# Patient Record
Sex: FEMALE | Race: WHITE | NOT HISPANIC OR LATINO
[De-identification: names, ages, dates, MRNs, and addresses within clinical notes are randomized per-mention and may not be internally consistent; named-entity substitution may affect disease eponyms.]

---

## 2017-01-19 RX ORDER — GABAPENTIN 300 MG/1
300 CAPSULE ORAL
Qty: 270 | Refills: 3 | Status: ACTIVE | COMMUNITY
Start: 2017-01-19 | End: 1900-01-01

## 2017-02-02 ENCOUNTER — OTHER (OUTPATIENT)
Age: 77
End: 2017-02-02

## 2017-02-03 ENCOUNTER — OTHER (OUTPATIENT)
Age: 77
End: 2017-02-03

## 2017-02-03 RX ORDER — GABAPENTIN 100 MG/1
100 CAPSULE ORAL
Qty: 90 | Refills: 0 | Status: DISCONTINUED | COMMUNITY
Start: 2017-02-02 | End: 2017-02-03

## 2017-02-16 RX ORDER — GABAPENTIN 300 MG/1
300 CAPSULE ORAL 3 TIMES DAILY
Qty: 90 | Refills: 11 | Status: ACTIVE | COMMUNITY
Start: 2017-02-16 | End: 1900-01-01

## 2017-02-20 RX ORDER — AMITRIPTYLINE HYDROCHLORIDE 10 MG/1
10 TABLET, FILM COATED ORAL
Qty: 30 | Refills: 3 | Status: ACTIVE | COMMUNITY
Start: 2017-02-20 | End: 1900-01-01

## 2017-05-17 ENCOUNTER — APPOINTMENT (OUTPATIENT)
Dept: OPHTHALMOLOGY | Facility: CLINIC | Age: 77
End: 2017-05-17

## 2017-09-07 ENCOUNTER — APPOINTMENT (OUTPATIENT)
Dept: OPHTHALMOLOGY | Facility: CLINIC | Age: 77
End: 2017-09-07
Payer: MEDICARE

## 2017-09-07 DIAGNOSIS — H00.15 CHALAZION LEFT LOWER EYELID: ICD-10-CM

## 2017-09-07 PROCEDURE — 92012 INTRM OPH EXAM EST PATIENT: CPT

## 2017-11-09 ENCOUNTER — APPOINTMENT (OUTPATIENT)
Dept: PULMONOLOGY | Facility: CLINIC | Age: 77
End: 2017-11-09
Payer: MEDICARE

## 2017-11-09 VITALS
HEIGHT: 66 IN | WEIGHT: 107 LBS | OXYGEN SATURATION: 98 % | DIASTOLIC BLOOD PRESSURE: 62 MMHG | TEMPERATURE: 98.7 F | HEART RATE: 67 BPM | SYSTOLIC BLOOD PRESSURE: 100 MMHG | BODY MASS INDEX: 17.19 KG/M2

## 2017-11-09 PROCEDURE — 99214 OFFICE O/P EST MOD 30 MIN: CPT | Mod: 25

## 2017-11-09 PROCEDURE — 94010 BREATHING CAPACITY TEST: CPT

## 2017-11-17 ENCOUNTER — LABORATORY RESULT (OUTPATIENT)
Age: 77
End: 2017-11-17

## 2017-12-26 RX ORDER — AZITHROMYCIN 500 MG/1
500 TABLET, FILM COATED ORAL
Qty: 12 | Refills: 11 | Status: ACTIVE | COMMUNITY
Start: 2017-12-26 | End: 1900-01-01

## 2017-12-29 LAB — ACID FAST STN SPT: ABNORMAL

## 2018-01-05 LAB — ACID FAST STN SPT: ABNORMAL

## 2018-01-07 RX ORDER — ALBUTEROL SULFATE 90 UG/1
108 (90 BASE) AEROSOL, METERED RESPIRATORY (INHALATION)
Qty: 1 | Refills: 11 | Status: ACTIVE | COMMUNITY
Start: 2018-01-07 | End: 1900-01-01

## 2018-01-23 ENCOUNTER — APPOINTMENT (OUTPATIENT)
Dept: PULMONOLOGY | Facility: CLINIC | Age: 78
End: 2018-01-23
Payer: MEDICARE

## 2018-01-23 VITALS
DIASTOLIC BLOOD PRESSURE: 70 MMHG | SYSTOLIC BLOOD PRESSURE: 120 MMHG | HEIGHT: 66 IN | BODY MASS INDEX: 17.19 KG/M2 | WEIGHT: 107 LBS | OXYGEN SATURATION: 96 % | TEMPERATURE: 98.3 F | HEART RATE: 68 BPM

## 2018-01-23 PROCEDURE — 94010 BREATHING CAPACITY TEST: CPT

## 2018-01-23 PROCEDURE — 99214 OFFICE O/P EST MOD 30 MIN: CPT | Mod: 25

## 2018-02-20 LAB — ACID FAST STN SPT: ABNORMAL

## 2018-03-29 ENCOUNTER — APPOINTMENT (OUTPATIENT)
Dept: OPHTHALMOLOGY | Facility: CLINIC | Age: 78
End: 2018-03-29
Payer: MEDICARE

## 2018-03-29 PROCEDURE — 92014 COMPRE OPH EXAM EST PT 1/>: CPT

## 2018-04-10 ENCOUNTER — APPOINTMENT (OUTPATIENT)
Dept: OPHTHALMOLOGY | Facility: CLINIC | Age: 78
End: 2018-04-10
Payer: MEDICARE

## 2018-04-10 DIAGNOSIS — H26.493 OTHER SECONDARY CATARACT, BILATERAL: ICD-10-CM

## 2018-04-10 PROCEDURE — 66821 AFTER CATARACT LASER SURGERY: CPT | Mod: RT

## 2018-04-18 ENCOUNTER — APPOINTMENT (OUTPATIENT)
Dept: OPHTHALMOLOGY | Facility: CLINIC | Age: 78
End: 2018-04-18
Payer: MEDICARE

## 2018-04-18 DIAGNOSIS — H26.491 OTHER SECONDARY CATARACT, RIGHT EYE: ICD-10-CM

## 2018-04-18 PROCEDURE — 99024 POSTOP FOLLOW-UP VISIT: CPT

## 2018-10-31 ENCOUNTER — APPOINTMENT (OUTPATIENT)
Dept: OPHTHALMOLOGY | Facility: CLINIC | Age: 78
End: 2018-10-31
Payer: MEDICARE

## 2018-10-31 DIAGNOSIS — H26.8 OTHER SPECIFIED CATARACT: ICD-10-CM

## 2018-10-31 DIAGNOSIS — H26.492 OTHER SECONDARY CATARACT, LEFT EYE: ICD-10-CM

## 2018-10-31 DIAGNOSIS — Z96.1 PRESENCE OF INTRAOCULAR LENS: ICD-10-CM

## 2018-10-31 DIAGNOSIS — H43.813 VITREOUS DEGENERATION, BILATERAL: ICD-10-CM

## 2018-10-31 DIAGNOSIS — H02.89 OTHER SPECIFIED DISORDERS OF EYELID: ICD-10-CM

## 2018-10-31 PROCEDURE — 92012 INTRM OPH EXAM EST PATIENT: CPT

## 2019-05-22 ENCOUNTER — NON-APPOINTMENT (OUTPATIENT)
Age: 79
End: 2019-05-22

## 2019-05-22 ENCOUNTER — APPOINTMENT (OUTPATIENT)
Dept: OPHTHALMOLOGY | Facility: CLINIC | Age: 79
End: 2019-05-22
Payer: MEDICARE

## 2019-05-22 PROCEDURE — 92014 COMPRE OPH EXAM EST PT 1/>: CPT

## 2020-05-27 ENCOUNTER — APPOINTMENT (OUTPATIENT)
Dept: OPHTHALMOLOGY | Facility: CLINIC | Age: 80
End: 2020-05-27

## 2020-06-16 ENCOUNTER — NON-APPOINTMENT (OUTPATIENT)
Age: 80
End: 2020-06-16

## 2020-06-16 ENCOUNTER — APPOINTMENT (OUTPATIENT)
Dept: OPHTHALMOLOGY | Facility: CLINIC | Age: 80
End: 2020-06-16
Payer: MEDICARE

## 2020-06-16 PROCEDURE — 99442: CPT | Mod: 95

## 2020-10-27 ENCOUNTER — NON-APPOINTMENT (OUTPATIENT)
Age: 80
End: 2020-10-27

## 2020-10-27 ENCOUNTER — APPOINTMENT (OUTPATIENT)
Dept: OPHTHALMOLOGY | Facility: CLINIC | Age: 80
End: 2020-10-27
Payer: MEDICARE

## 2020-10-27 PROCEDURE — 92014 COMPRE OPH EXAM EST PT 1/>: CPT

## 2021-07-02 ENCOUNTER — INPATIENT (INPATIENT)
Facility: HOSPITAL | Age: 81
LOS: 0 days | Discharge: HOME | DRG: 313 | End: 2021-07-03
Attending: INTERNAL MEDICINE | Admitting: INTERNAL MEDICINE
Payer: COMMERCIAL

## 2021-07-02 VITALS
HEIGHT: 65 IN | RESPIRATION RATE: 18 BRPM | HEART RATE: 65 BPM | DIASTOLIC BLOOD PRESSURE: 79 MMHG | WEIGHT: 104.94 LBS | SYSTOLIC BLOOD PRESSURE: 160 MMHG | OXYGEN SATURATION: 99 % | TEMPERATURE: 98 F

## 2021-07-02 LAB
ALBUMIN SERPL ELPH-MCNC: 4.4 G/DL — SIGNIFICANT CHANGE UP (ref 3.3–5)
ALP SERPL-CCNC: 49 U/L — SIGNIFICANT CHANGE UP (ref 40–120)
ALT FLD-CCNC: 10 U/L — SIGNIFICANT CHANGE UP (ref 10–45)
ANION GAP SERPL CALC-SCNC: 11 MMOL/L — SIGNIFICANT CHANGE UP (ref 5–17)
AST SERPL-CCNC: 19 U/L — SIGNIFICANT CHANGE UP (ref 10–40)
BASOPHILS # BLD AUTO: 0.05 K/UL — SIGNIFICANT CHANGE UP (ref 0–0.2)
BASOPHILS NFR BLD AUTO: 0.8 % — SIGNIFICANT CHANGE UP (ref 0–2)
BILIRUB SERPL-MCNC: 0.7 MG/DL — SIGNIFICANT CHANGE UP (ref 0.2–1.2)
BUN SERPL-MCNC: 8 MG/DL — SIGNIFICANT CHANGE UP (ref 7–23)
CALCIUM SERPL-MCNC: 8.3 MG/DL — LOW (ref 8.4–10.5)
CHLORIDE SERPL-SCNC: 92 MMOL/L — LOW (ref 96–108)
CK MB CFR SERPL CALC: 4.4 NG/ML — SIGNIFICANT CHANGE UP (ref 0–6.7)
CK MB CFR SERPL CALC: 5.2 NG/ML — SIGNIFICANT CHANGE UP (ref 0–6.7)
CK SERPL-CCNC: 112 U/L — SIGNIFICANT CHANGE UP (ref 25–170)
CK SERPL-CCNC: 115 U/L — SIGNIFICANT CHANGE UP (ref 25–170)
CO2 SERPL-SCNC: 23 MMOL/L — SIGNIFICANT CHANGE UP (ref 22–31)
CREAT SERPL-MCNC: 0.72 MG/DL — SIGNIFICANT CHANGE UP (ref 0.5–1.3)
EOSINOPHIL # BLD AUTO: 0.08 K/UL — SIGNIFICANT CHANGE UP (ref 0–0.5)
EOSINOPHIL NFR BLD AUTO: 1.3 % — SIGNIFICANT CHANGE UP (ref 0–6)
GLUCOSE SERPL-MCNC: 103 MG/DL — HIGH (ref 70–99)
HCT VFR BLD CALC: 37.9 % — SIGNIFICANT CHANGE UP (ref 34.5–45)
HGB BLD-MCNC: 12.7 G/DL — SIGNIFICANT CHANGE UP (ref 11.5–15.5)
IMM GRANULOCYTES NFR BLD AUTO: 0.3 % — SIGNIFICANT CHANGE UP (ref 0–1.5)
LYMPHOCYTES # BLD AUTO: 1.92 K/UL — SIGNIFICANT CHANGE UP (ref 1–3.3)
LYMPHOCYTES # BLD AUTO: 31.5 % — SIGNIFICANT CHANGE UP (ref 13–44)
MCHC RBC-ENTMCNC: 30.6 PG — SIGNIFICANT CHANGE UP (ref 27–34)
MCHC RBC-ENTMCNC: 33.5 GM/DL — SIGNIFICANT CHANGE UP (ref 32–36)
MCV RBC AUTO: 91.3 FL — SIGNIFICANT CHANGE UP (ref 80–100)
MONOCYTES # BLD AUTO: 0.7 K/UL — SIGNIFICANT CHANGE UP (ref 0–0.9)
MONOCYTES NFR BLD AUTO: 11.5 % — SIGNIFICANT CHANGE UP (ref 2–14)
NEUTROPHILS # BLD AUTO: 3.33 K/UL — SIGNIFICANT CHANGE UP (ref 1.8–7.4)
NEUTROPHILS NFR BLD AUTO: 54.6 % — SIGNIFICANT CHANGE UP (ref 43–77)
NRBC # BLD: 0 /100 WBCS — SIGNIFICANT CHANGE UP (ref 0–0)
PLATELET # BLD AUTO: 263 K/UL — SIGNIFICANT CHANGE UP (ref 150–400)
POTASSIUM SERPL-MCNC: 3.9 MMOL/L — SIGNIFICANT CHANGE UP (ref 3.5–5.3)
POTASSIUM SERPL-SCNC: 3.9 MMOL/L — SIGNIFICANT CHANGE UP (ref 3.5–5.3)
PROT SERPL-MCNC: 7 G/DL — SIGNIFICANT CHANGE UP (ref 6–8.3)
RBC # BLD: 4.15 M/UL — SIGNIFICANT CHANGE UP (ref 3.8–5.2)
RBC # FLD: 13 % — SIGNIFICANT CHANGE UP (ref 10.3–14.5)
SARS-COV-2 RNA SPEC QL NAA+PROBE: NEGATIVE — SIGNIFICANT CHANGE UP
SODIUM SERPL-SCNC: 126 MMOL/L — LOW (ref 135–145)
TROPONIN T SERPL-MCNC: <0.01 NG/ML — SIGNIFICANT CHANGE UP (ref 0–0.01)
TROPONIN T SERPL-MCNC: <0.01 NG/ML — SIGNIFICANT CHANGE UP (ref 0–0.01)
WBC # BLD: 6.1 K/UL — SIGNIFICANT CHANGE UP (ref 3.8–10.5)
WBC # FLD AUTO: 6.1 K/UL — SIGNIFICANT CHANGE UP (ref 3.8–10.5)

## 2021-07-02 PROCEDURE — 93010 ELECTROCARDIOGRAM REPORT: CPT

## 2021-07-02 PROCEDURE — 71045 X-RAY EXAM CHEST 1 VIEW: CPT | Mod: 26

## 2021-07-02 PROCEDURE — 99285 EMERGENCY DEPT VISIT HI MDM: CPT | Mod: CS

## 2021-07-02 RX ORDER — POLYETHYLENE GLYCOL 3350 17 G/17G
17 POWDER, FOR SOLUTION ORAL ONCE
Refills: 0 | Status: COMPLETED | OUTPATIENT
Start: 2021-07-02 | End: 2021-07-02

## 2021-07-02 RX ORDER — ENOXAPARIN SODIUM 100 MG/ML
40 INJECTION SUBCUTANEOUS EVERY 24 HOURS
Refills: 0 | Status: DISCONTINUED | OUTPATIENT
Start: 2021-07-02 | End: 2021-07-03

## 2021-07-02 RX ORDER — SENNA PLUS 8.6 MG/1
2 TABLET ORAL AT BEDTIME
Refills: 0 | Status: DISCONTINUED | OUTPATIENT
Start: 2021-07-02 | End: 2021-07-03

## 2021-07-02 RX ADMIN — POLYETHYLENE GLYCOL 3350 17 GRAM(S): 17 POWDER, FOR SOLUTION ORAL at 22:49

## 2021-07-02 RX ADMIN — SENNA PLUS 2 TABLET(S): 8.6 TABLET ORAL at 22:49

## 2021-07-02 RX ADMIN — ENOXAPARIN SODIUM 40 MILLIGRAM(S): 100 INJECTION SUBCUTANEOUS at 22:49

## 2021-07-02 NOTE — ED ADULT TRIAGE NOTE - OTHER COMPLAINTS
Pt states she called her MD, who recommended her to come here. Pt denies any PMH. however, pt reports SOB on exertion for a while. Pt denies any chest pain, SOB or palpitation at the moment. States her BP was in the 170s at home

## 2021-07-02 NOTE — H&P ADULT - PROBLEM SELECTOR PLAN 4
F: none  E: Replete K<4, Mg<2 F: none  E: Replete K<4, Mg<2  N: Regular Diet  DVT ppx: Lovenox 40mg subq  Dispo: RMF

## 2021-07-02 NOTE — H&P ADULT - ASSESSMENT
80F no PMH presenting with chest tightness which started a few hours prior to admission, admitted for further evaluation.

## 2021-07-02 NOTE — H&P ADULT - NSHPPHYSICALEXAM_GEN_ALL_CORE
Vital Signs Last 12 Hrs  T(F): 98 (07-02-21 @ 22:34), Max: 98.2 (07-02-21 @ 18:43)  HR: 62 (07-02-21 @ 22:34) (62 - 65)  BP: 169/72 (07-02-21 @ 22:34) (140/72 - 169/72)  BP(mean): --  RR: 16 (07-02-21 @ 22:34) (16 - 18)  SpO2: 97% (07-02-21 @ 22:34) (97% - 99%)    PHYSICAL EXAM:  Constitutional: NAD, comfortable in bed.  HEENT: NC/AT, PERRLA, EOMI, no conjunctival pallor or scleral icterus, MMM  Neck: Supple, no JVD  Respiratory: CTA B/L. No w/r/r.   Cardiovascular: RRR, normal S1 and S2, no m/r/g.   Gastrointestinal: +BS, soft NTND, no guarding or rebound tenderness, no palpable masses   Extremities: wwp; no cyanosis, clubbing or edema.   Vascular: Pulses equal and strong throughout.   Neurological: AAOx3, no CN deficits, strength and sensation intact throughout.   Skin: No gross skin abnormalities or rashes Vital Signs Last 12 Hrs  T(F): 98 (07-02-21 @ 22:34), Max: 98.2 (07-02-21 @ 18:43)  HR: 62 (07-02-21 @ 22:34) (62 - 65)  BP: 169/72 (07-02-21 @ 22:34) (140/72 - 169/72)  BP(mean): --  RR: 16 (07-02-21 @ 22:34) (16 - 18)  SpO2: 97% (07-02-21 @ 22:34) (97% - 99%)    PHYSICAL EXAM:  Constitutional: NAD, comfortable in bed.  HEENT: NC/AT, PERRLA, EOMI, no conjunctival pallor or scleral icterus, dry mucus membranes  Neck: Supple, no JVD  Respiratory: CTA B/L. No w/r/r.   Cardiovascular: RRR, normal S1 and S2, no m/r/g.   Gastrointestinal: +BS, soft NTND, no guarding or rebound tenderness, no palpable masses   Extremities: wwp; no cyanosis, clubbing or edema.   Vascular: Pulses equal and strong throughout.   Neurological: AAOx3, no CN deficits, strength and sensation intact throughout.   Skin: No gross skin abnormalities or rashes

## 2021-07-02 NOTE — PATIENT PROFILE ADULT - OVER THE PAST TWO WEEKS HAVE YOU FELT DOWN, DEPRESSED OR HOPELESS?
pt states she's had trouble coping with the loss of her  who  in January; [facility]

## 2021-07-02 NOTE — H&P ADULT - PROBLEM SELECTOR PLAN 1
- Recent cardiac workup with Dr. Cavanaugh included an echo and a CTA coronary:  - Normal left main, no plaque or stenosis. Mid R coronary <25% plaque; mild increased risk for CAD.   - Echo: normal wall motion and left ventricular function; mild AR, mild MR, normal EF, normal LV compliance.  - EKG on admission with borderline RBBB and no acute ischemic changes  - cardiac enzymes negative x 2  - chest tightness self-resolved presents with a few hours of chest tightness that started suddenly. 2/10 in severity. She denies any exacerbating or relieving factors and denies radiation. Pain not reproducible. Hemodynamically stable on admission. Chest pain self-resolved. Less likely ACS related.  - Recent cardiac workup with Dr. Cavanaugh included an echo and a CTA coronary:  - Normal left main, no plaque or stenosis. Mid R coronary <25% plaque; mild increased risk for CAD.   - Echo: normal wall motion and left ventricular function; mild AR, mild MR, normal EF, normal LV compliance.  - EKG on admission with borderline RBBB and no acute ischemic changes  - CXR clear  - cardiac enzymes negative x 2  - chest tightness self-resolved  - f/u AM EKG  - continue to monitor

## 2021-07-02 NOTE — H&P ADULT - PROBLEM SELECTOR PLAN 2
endorses SOB while walking a few blocks for the past 2 months. At baseline, patient says she is able to walk 2-3 miles. Patient admits to having chronic cough which has been treated as neurogenic.  - On exam, patient with no JVD, crackles, or LE edema  - BNP 1986  - recent echo normal wall motion and left ventricular function; mild AR, mild MR, normal EF, normal LV compliance.  - CXR clear  - saturating well on room air

## 2021-07-02 NOTE — H&P ADULT - HISTORY OF PRESENT ILLNESS
80F no PMH presenting with chest tightness which started a few hours prior to admission She notes that the chest tightness began suddenly and was a 2/10 in severity and did no radiate anywhere. She also felt short of breath and took 3 puffs of her inhaler which increased her sBPs to 170s. She says that she is usually able to walk 2-3 miles but for the past 2 months has been having difficulty walking a few blocks given her dyspnea on exertion. She has a chronic cough for the past few years attributed to neurogenic causes. She recently had a cardiac evaluation with Dr. Cavanaugh a month ago - previous echo and CTA coronary was done. Normal left main, no plaque or stenosis. Mid R coronary <25% plaque; mild increased risk for CAD. Echo with normal wall motion and left ventricular function; mild AR, mild MR, normal EF, normal LV compliance. She denies any smoking history. She only takes aspirin at home and has an inhaler for a past diagnosis of asthma which she rarely uses.    ED Course:  Vitals: Tmax 98.2F, HR 62, /72, RR 16, spO2 98% room air  Labs: wbc 6.10, hb 12.7, plts 263, Na 126, K 3.9, Cl 92, BUN/Cr 8/0.72, glucose 103, calcium 8.3, cardiac enzymes negative x2  EKG: NSR with borderline RBBB  Imaging: CXR clear  Interventions: none   80F no PMH presenting with chest tightness which started a few hours prior to admission. She notes that the chest tightness began suddenly and was a 2/10 in severity and did no radiate anywhere. She also felt short of breath and took 3 puffs of her inhaler which increased her sBPs to 170s. She says that she is usually able to walk 2-3 miles but for the past 2 months has been having difficulty walking a few blocks given her dyspnea on exertion. She has a chronic cough for the past few years attributed to neurogenic causes. She recently had a cardiac evaluation with Dr. Cavanaugh a month ago - previous echo and CTA coronary was done. Normal left main, no plaque or stenosis. Mid R coronary <25% plaque; mild increased risk for CAD. Echo with normal wall motion and left ventricular function; mild AR, mild MR, normal EF, normal LV compliance. She denies any smoking history. She only takes aspirin at home and has an inhaler for a past diagnosis of asthma which she rarely uses.    ED Course:  Vitals: Tmax 98.2F, HR 62, /72, RR 16, spO2 98% room air  Labs: wbc 6.10, hb 12.7, plts 263, Na 126, K 3.9, Cl 92, BUN/Cr 8/0.72, glucose 103, calcium 8.3, cardiac enzymes negative x2  EKG: NSR with borderline RBBB  Imaging: CXR clear  Interventions: none   80F no PMH presenting with chest tightness which started a few hours prior to admission. She notes that the chest tightness began suddenly and was a 2/10 in severity and did not radiate anywhere. She also felt short of breath and took 3 puffs of her inhaler which increased her sBPs to 170s. She says that she is usually able to walk 2-3 miles but for the past 2 months has been having difficulty walking a few blocks given her dyspnea on exertion. She has a chronic cough for the past few years attributed to neurogenic causes. She recently had a cardiac evaluation with Dr. Cavanaugh a month ago - previous echo and CTA coronary was done. Normal left main, no plaque or stenosis. Mid R coronary <25% plaque; mild increased risk for CAD. Echo with normal wall motion and left ventricular function; mild AR, mild MR, normal EF, normal LV compliance. She denies any smoking history. She only takes aspirin at home and has an inhaler for a past diagnosis of asthma which she rarely uses.    ED Course:  Vitals: Tmax 98.2F, HR 62, /72, RR 16, spO2 98% room air  Labs: wbc 6.10, hb 12.7, plts 263, Na 126, K 3.9, Cl 92, BUN/Cr 8/0.72, glucose 103, calcium 8.3, cardiac enzymes negative x2  EKG: NSR with borderline RBBB  Imaging: CXR clear  Interventions: none   80F no PMH presenting with chest tightness which started a few hours prior to admission. She notes that the chest tightness began suddenly and was a 2/10 in severity and did not radiate anywhere. She also felt short of breath and took 3 puffs of her inhaler which increased her sBPs to 170s. She says that she is usually able to walk 2-3 miles but for the past 2 months has been having difficulty walking a few blocks given her dyspnea on exertion. She has a chronic cough for the past few years attributed to neurogenic causes. She recently had a cardiac evaluation with Dr. Cavanaugh a month ago - echo and CTA coronary were performed showing normal left main, no plaque or stenosis. Mid R coronary <25% plaque; mild increased risk for CAD. Echo with normal wall motion and left ventricular function; mild AR, mild MR, normal EF, normal LV compliance. She denies any smoking history. She only takes aspirin at home and has an inhaler for a past diagnosis of asthma which she rarely uses.    ED Course:  Vitals: Tmax 98.2F, HR 62, /72, RR 16, spO2 98% room air  Labs: wbc 6.10, hb 12.7, plts 263, Na 126, K 3.9, Cl 92, BUN/Cr 8/0.72, glucose 103, calcium 8.3, cardiac enzymes negative x2  EKG: NSR with borderline RBBB  Imaging: CXR clear  Interventions: none

## 2021-07-02 NOTE — H&P ADULT - NSHPLABSRESULTS_GEN_ALL_CORE
LABS:                         12.7   6.10  )-----------( 263      ( 02 Jul 2021 19:38 )             37.9     07-02    126<L>  |  92<L>  |  8   ----------------------------<  103<H>  3.9   |  23  |  0.72    Ca    8.3<L>      02 Jul 2021 19:39    TPro  7.0  /  Alb  4.4  /  TBili  0.7  /  DBili  x   /  AST  19  /  ALT  10  /  AlkPhos  49  07-02        CARDIAC MARKERS ( 02 Jul 2021 22:21 )  x     / <0.01 ng/mL / 112 U/L / x     / 4.4 ng/mL  CARDIAC MARKERS ( 02 Jul 2021 19:39 )  x     / <0.01 ng/mL / 115 U/L / x     / 5.2 ng/mL      Serum Pro-Brain Natriuretic Peptide: 1986 pg/mL (07-02 @ 19:39)        RADIOLOGY, EKG & ADDITIONAL TESTS:

## 2021-07-02 NOTE — ED PROVIDER NOTE - CLINICAL SUMMARY MEDICAL DECISION MAKING FREE TEXT BOX
Pt is a 80F with chest pain. Will check labs, CXR, EKG.   Case discussed with Dr. Cavanaugh- previous echo and CTA coronary was done. Pt with normal left main, no plaque or stenosis. Mid R coronary <25% plaque; mild increased risk for CAD.   Echo: normal wall motion and left ventricular function; mild AR, mild MR, normal EF, normal LV compliance.  Pt seen by cardiology fellow, recommended admission to medicine.   Case discussed with Dr. Blue- accepts pt to his service.

## 2021-07-02 NOTE — H&P ADULT - PROBLEM SELECTOR PLAN 3
presenting with Na 126 with unknown baseline. Patient mentating well. Likely due to hypovolemic hyponatremia given patient appears dry on exam  - continue to encourage PO intake  - f/u serum osmol  - f/u urine lytes  - continue to monitor BMP

## 2021-07-02 NOTE — ED ADULT NURSE NOTE - OBJECTIVE STATEMENT
Patient is a 81yo F, arrived to ED via walk-in, AAOx3, in NAD, VSS, complaining of chest tightness at 5PM today, self administered 4-5 puffs of her inhaler, called her PMD, who referred her to the ED.  Patient also complaining of GAFFNEY for one month.  Patient denies any SOB at rest, N/V/D, fevers or any other complaints at this time.  PIV placed, labs drawn and sent, EKG done.

## 2021-07-03 ENCOUNTER — TRANSCRIPTION ENCOUNTER (OUTPATIENT)
Age: 81
End: 2021-07-03

## 2021-07-03 VITALS
RESPIRATION RATE: 17 BRPM | TEMPERATURE: 98 F | OXYGEN SATURATION: 95 % | SYSTOLIC BLOOD PRESSURE: 120 MMHG | DIASTOLIC BLOOD PRESSURE: 70 MMHG | HEART RATE: 52 BPM

## 2021-07-03 DIAGNOSIS — R07.89 OTHER CHEST PAIN: ICD-10-CM

## 2021-07-03 DIAGNOSIS — Z90.710 ACQUIRED ABSENCE OF BOTH CERVIX AND UTERUS: Chronic | ICD-10-CM

## 2021-07-03 DIAGNOSIS — E87.1 HYPO-OSMOLALITY AND HYPONATREMIA: ICD-10-CM

## 2021-07-03 DIAGNOSIS — Z29.9 ENCOUNTER FOR PROPHYLACTIC MEASURES, UNSPECIFIED: ICD-10-CM

## 2021-07-03 DIAGNOSIS — R06.00 DYSPNEA, UNSPECIFIED: ICD-10-CM

## 2021-07-03 LAB
ALBUMIN SERPL ELPH-MCNC: 3.7 G/DL — SIGNIFICANT CHANGE UP (ref 3.3–5)
ALP SERPL-CCNC: 43 U/L — SIGNIFICANT CHANGE UP (ref 40–120)
ALT FLD-CCNC: 9 U/L — LOW (ref 10–45)
ANION GAP SERPL CALC-SCNC: 7 MMOL/L — SIGNIFICANT CHANGE UP (ref 5–17)
AST SERPL-CCNC: 15 U/L — SIGNIFICANT CHANGE UP (ref 10–40)
BASOPHILS # BLD AUTO: 0.05 K/UL — SIGNIFICANT CHANGE UP (ref 0–0.2)
BASOPHILS NFR BLD AUTO: 1 % — SIGNIFICANT CHANGE UP (ref 0–2)
BILIRUB SERPL-MCNC: 0.8 MG/DL — SIGNIFICANT CHANGE UP (ref 0.2–1.2)
BUN SERPL-MCNC: 7 MG/DL — SIGNIFICANT CHANGE UP (ref 7–23)
CALCIUM SERPL-MCNC: 8.3 MG/DL — LOW (ref 8.4–10.5)
CHLORIDE SERPL-SCNC: 104 MMOL/L — SIGNIFICANT CHANGE UP (ref 96–108)
CO2 SERPL-SCNC: 22 MMOL/L — SIGNIFICANT CHANGE UP (ref 22–31)
COVID-19 SPIKE DOMAIN AB INTERP: POSITIVE
COVID-19 SPIKE DOMAIN ANTIBODY RESULT: >250 U/ML — HIGH
CREAT SERPL-MCNC: 0.68 MG/DL — SIGNIFICANT CHANGE UP (ref 0.5–1.3)
EOSINOPHIL # BLD AUTO: 0.11 K/UL — SIGNIFICANT CHANGE UP (ref 0–0.5)
EOSINOPHIL NFR BLD AUTO: 2.3 % — SIGNIFICANT CHANGE UP (ref 0–6)
GLUCOSE SERPL-MCNC: 99 MG/DL — SIGNIFICANT CHANGE UP (ref 70–99)
HCT VFR BLD CALC: 37.6 % — SIGNIFICANT CHANGE UP (ref 34.5–45)
HGB BLD-MCNC: 12.7 G/DL — SIGNIFICANT CHANGE UP (ref 11.5–15.5)
IMM GRANULOCYTES NFR BLD AUTO: 0.4 % — SIGNIFICANT CHANGE UP (ref 0–1.5)
LYMPHOCYTES # BLD AUTO: 1.92 K/UL — SIGNIFICANT CHANGE UP (ref 1–3.3)
LYMPHOCYTES # BLD AUTO: 39.3 % — SIGNIFICANT CHANGE UP (ref 13–44)
MAGNESIUM SERPL-MCNC: 2.1 MG/DL — SIGNIFICANT CHANGE UP (ref 1.6–2.6)
MCHC RBC-ENTMCNC: 30.2 PG — SIGNIFICANT CHANGE UP (ref 27–34)
MCHC RBC-ENTMCNC: 33.8 GM/DL — SIGNIFICANT CHANGE UP (ref 32–36)
MCV RBC AUTO: 89.5 FL — SIGNIFICANT CHANGE UP (ref 80–100)
MONOCYTES # BLD AUTO: 0.54 K/UL — SIGNIFICANT CHANGE UP (ref 0–0.9)
MONOCYTES NFR BLD AUTO: 11.1 % — SIGNIFICANT CHANGE UP (ref 2–14)
NEUTROPHILS # BLD AUTO: 2.24 K/UL — SIGNIFICANT CHANGE UP (ref 1.8–7.4)
NEUTROPHILS NFR BLD AUTO: 45.9 % — SIGNIFICANT CHANGE UP (ref 43–77)
NRBC # BLD: 0 /100 WBCS — SIGNIFICANT CHANGE UP (ref 0–0)
OSMOLALITY SERPL: 273 MOSM/KG — LOW (ref 280–301)
OSMOLALITY UR: 208 MOSM/KG — LOW (ref 300–900)
PHOSPHATE SERPL-MCNC: 3.5 MG/DL — SIGNIFICANT CHANGE UP (ref 2.5–4.5)
PLATELET # BLD AUTO: 265 K/UL — SIGNIFICANT CHANGE UP (ref 150–400)
POTASSIUM SERPL-MCNC: 4.3 MMOL/L — SIGNIFICANT CHANGE UP (ref 3.5–5.3)
POTASSIUM SERPL-SCNC: 4.3 MMOL/L — SIGNIFICANT CHANGE UP (ref 3.5–5.3)
PROT SERPL-MCNC: 6.1 G/DL — SIGNIFICANT CHANGE UP (ref 6–8.3)
RBC # BLD: 4.2 M/UL — SIGNIFICANT CHANGE UP (ref 3.8–5.2)
RBC # FLD: 12.7 % — SIGNIFICANT CHANGE UP (ref 10.3–14.5)
SARS-COV-2 IGG+IGM SERPL QL IA: >250 U/ML — HIGH
SARS-COV-2 IGG+IGM SERPL QL IA: POSITIVE
SODIUM SERPL-SCNC: 133 MMOL/L — LOW (ref 135–145)
SODIUM UR-SCNC: 54 MMOL/L — SIGNIFICANT CHANGE UP
WBC # BLD: 4.88 K/UL — SIGNIFICANT CHANGE UP (ref 3.8–10.5)
WBC # FLD AUTO: 4.88 K/UL — SIGNIFICANT CHANGE UP (ref 3.8–10.5)

## 2021-07-03 PROCEDURE — 82553 CREATINE MB FRACTION: CPT

## 2021-07-03 PROCEDURE — 83935 ASSAY OF URINE OSMOLALITY: CPT

## 2021-07-03 PROCEDURE — 93005 ELECTROCARDIOGRAM TRACING: CPT

## 2021-07-03 PROCEDURE — 84484 ASSAY OF TROPONIN QUANT: CPT

## 2021-07-03 PROCEDURE — 82550 ASSAY OF CK (CPK): CPT

## 2021-07-03 PROCEDURE — 36415 COLL VENOUS BLD VENIPUNCTURE: CPT

## 2021-07-03 PROCEDURE — 84300 ASSAY OF URINE SODIUM: CPT

## 2021-07-03 PROCEDURE — 83735 ASSAY OF MAGNESIUM: CPT

## 2021-07-03 PROCEDURE — 83930 ASSAY OF BLOOD OSMOLALITY: CPT

## 2021-07-03 PROCEDURE — 83880 ASSAY OF NATRIURETIC PEPTIDE: CPT

## 2021-07-03 PROCEDURE — 86769 SARS-COV-2 COVID-19 ANTIBODY: CPT

## 2021-07-03 PROCEDURE — 84100 ASSAY OF PHOSPHORUS: CPT

## 2021-07-03 PROCEDURE — 99285 EMERGENCY DEPT VISIT HI MDM: CPT | Mod: 25

## 2021-07-03 PROCEDURE — 85025 COMPLETE CBC W/AUTO DIFF WBC: CPT

## 2021-07-03 PROCEDURE — 80053 COMPREHEN METABOLIC PANEL: CPT

## 2021-07-03 PROCEDURE — 87635 SARS-COV-2 COVID-19 AMP PRB: CPT

## 2021-07-03 PROCEDURE — 71045 X-RAY EXAM CHEST 1 VIEW: CPT

## 2021-07-03 RX ORDER — ASPIRIN/CALCIUM CARB/MAGNESIUM 324 MG
1 TABLET ORAL
Qty: 0 | Refills: 0 | DISCHARGE

## 2021-07-03 NOTE — DISCHARGE NOTE NURSING/CASE MANAGEMENT/SOCIAL WORK - PATIENT PORTAL LINK FT
You can access the FollowMyHealth Patient Portal offered by [facility] by registering at the following website: [email or web address]. By joining [facility]’s FollowMyHealth portal, you will also be able to view your health information using other applications (apps) compatible with our system.

## 2021-07-03 NOTE — DISCHARGE NOTE PROVIDER - NSDCCPCAREPLAN_GEN_ALL_CORE_FT
PRINCIPAL DISCHARGE DIAGNOSIS  Diagnosis: Chest pain  Assessment and Plan of Treatment: You presented to the hospital for evaluation of chest pain. As an outaptient you had a coronary CT scan which was negative for any significant disease of your coronary arteries. In the hospital you had EKGs done and labwork to measure any damage done to the heart - all of these tests were negative. Please continue to follow up with Dr. Blue as an outpatient for further management and care.

## 2021-07-03 NOTE — PROGRESS NOTE ADULT - SUBJECTIVE AND OBJECTIVE BOX
No complaints this am VS stable Afeb In RR with ectopy Chest is clear Abd is soft No edema  Na back up

## 2021-07-03 NOTE — DISCHARGE NOTE PROVIDER - HOSPITAL COURSE
#Discharge: do not delete    80F no PMH presenting with chest tightness which started a few hours prior to admission, admitted for further evaluation.    Problem List/Main Diagnoses (system-based):   #Chest Tightness  Presented with a few hours of chest tightness that started suddenly. 2/10 in severity. She denies any exacerbating or relieving factors and denies radiation. Pain not reproducible. Hemodynamically stable on admission. Chest pain self-resolved. Less likely ACS related.  - Recent cardiac workup with Dr. Cavanaugh included an echo and a CTA coronary:  - Normal left main, no plaque or stenosis. Mid R coronary <25% plaque; mild increased risk for CAD.   - Echo: normal wall motion and left ventricular function; mild AR, mild MR, normal EF, normal LV compliance.  - EKG on admission with borderline RBBB and no acute ischemic changes  - CXR clear  - cardiac enzymes negative x 2  - chest tightness self-resolved  - AM EKG unchanged   - outpatient follow up with Dr. Blue     Inpatient treatment course: As above     New medications: None  Labs to be followed outpatient: None  Exam to be followed outpatient: None

## 2021-07-03 NOTE — DISCHARGE NOTE PROVIDER - CARE PROVIDER_API CALL
Edgar Blue  INTERNAL MEDICINE  [address] [place] [id number]  Phone: [phone]  Fax: [phone]  Follow Up Time:

## 2021-07-12 ENCOUNTER — APPOINTMENT (OUTPATIENT)
Dept: PULMONOLOGY | Facility: CLINIC | Age: 81
End: 2021-07-12
Payer: MEDICARE

## 2021-07-12 VITALS
HEIGHT: 66 IN | SYSTOLIC BLOOD PRESSURE: 120 MMHG | TEMPERATURE: 97.6 F | HEART RATE: 62 BPM | BODY MASS INDEX: 16.88 KG/M2 | OXYGEN SATURATION: 98 % | WEIGHT: 105 LBS | DIASTOLIC BLOOD PRESSURE: 66 MMHG

## 2021-07-12 PROCEDURE — 71046 X-RAY EXAM CHEST 2 VIEWS: CPT

## 2021-07-12 PROCEDURE — 99215 OFFICE O/P EST HI 40 MIN: CPT | Mod: 25

## 2021-07-12 PROCEDURE — 94010 BREATHING CAPACITY TEST: CPT

## 2021-07-13 NOTE — PHYSICAL EXAM
[Normal Oropharynx] : normal oropharynx [Normal Appearance] : normal appearance [No Neck Mass] : no neck mass [Normal Rate/Rhythm] : normal rate/rhythm [Normal S1, S2] : normal s1, s2 [No Murmurs] : no murmurs [No Resp Distress] : no resp distress [Clear to Auscultation Bilaterally] : clear to auscultation bilaterally [No Abnormalities] : no abnormalities [Benign] : benign [Normal Gait] : normal gait [No Clubbing] : no clubbing [No Cyanosis] : no cyanosis [No Edema] : no edema [FROM] : FROM [Normal Color/ Pigmentation] : normal color/ pigmentation [No Focal Deficits] : no focal deficits [Oriented x3] : oriented x3 [Normal Affect] : normal affect [TextBox_2] : chronically thin.

## 2021-07-13 NOTE — PROCEDURE
[FreeTextEntry1] : spirometry with diminution in flows when compared to 2018\par \par ct revewed bronchiectasis, small nodules\par \par the change has not been extensive

## 2021-07-13 NOTE — DISCUSSION/SUMMARY
[FreeTextEntry1] : very long discussion with her re pfts and maritza\par and how they don’t' often correlate in terms of severity in relationship to each other.\par \par will try her on trelogy for a month and then have her return and see if things are improved\par \par not clear if this is reversible

## 2021-07-13 NOTE — HISTORY OF PRESENT ILLNESS
[TextBox_4] : increasing sob over the years\par \par llife long search for chronic cough etology.  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

## 2021-07-15 DIAGNOSIS — R07.9 CHEST PAIN, UNSPECIFIED: ICD-10-CM

## 2021-07-15 DIAGNOSIS — E87.1 HYPO-OSMOLALITY AND HYPONATREMIA: ICD-10-CM

## 2021-07-15 DIAGNOSIS — R06.00 DYSPNEA, UNSPECIFIED: ICD-10-CM

## 2021-08-13 ENCOUNTER — APPOINTMENT (OUTPATIENT)
Dept: PULMONOLOGY | Facility: CLINIC | Age: 81
End: 2021-08-13
Payer: MEDICARE

## 2021-08-13 VITALS
TEMPERATURE: 97.6 F | HEART RATE: 59 BPM | BODY MASS INDEX: 16.88 KG/M2 | WEIGHT: 105 LBS | HEIGHT: 66 IN | SYSTOLIC BLOOD PRESSURE: 135 MMHG | OXYGEN SATURATION: 97 % | DIASTOLIC BLOOD PRESSURE: 73 MMHG

## 2021-08-13 PROCEDURE — 99214 OFFICE O/P EST MOD 30 MIN: CPT

## 2021-08-15 NOTE — HISTORY OF PRESENT ILLNESS
[TextBox_4] : patient started on trelegy and is breathing better\par \par however my concern is the fluticasone steroid and it's effect on KOKI\par \par did chest film today to follow\par \par does not meet criteria for treatment\par \par afb was positive this time.\par \par could not check spirometyr due to new covid restricdtions

## 2021-08-15 NOTE — DISCUSSION/SUMMARY
[FreeTextEntry1] : will switch to budesonide containing regimen\par \par if trelogy has improved the lung function \par \par

## 2021-08-29 DIAGNOSIS — A31.0 PULMONARY MYCOBACTERIAL INFECTION: ICD-10-CM

## 2021-08-29 RX ORDER — ETHAMBUTOL HYDROCHLORIDE 400 MG/1
400 TABLET ORAL
Qty: 45 | Refills: 3 | Status: ACTIVE | COMMUNITY
Start: 2021-08-29 | End: 1900-01-01

## 2021-09-28 ENCOUNTER — APPOINTMENT (OUTPATIENT)
Dept: PULMONOLOGY | Facility: CLINIC | Age: 81
End: 2021-09-28
Payer: MEDICARE

## 2021-09-28 VITALS
SYSTOLIC BLOOD PRESSURE: 110 MMHG | DIASTOLIC BLOOD PRESSURE: 62 MMHG | TEMPERATURE: 97.9 F | HEIGHT: 66 IN | RESPIRATION RATE: 16 BRPM | OXYGEN SATURATION: 98 % | HEART RATE: 56 BPM | BODY MASS INDEX: 16.88 KG/M2 | WEIGHT: 105 LBS

## 2021-09-28 PROCEDURE — 94010 BREATHING CAPACITY TEST: CPT

## 2021-09-28 PROCEDURE — 99214 OFFICE O/P EST MOD 30 MIN: CPT | Mod: 25

## 2021-09-30 NOTE — PHYSICAL EXAM
[No Acute Distress] : no acute distress [Normal Oropharynx] : normal oropharynx [Normal Appearance] : normal appearance [No Neck Mass] : no neck mass [Normal Rate/Rhythm] : normal rate/rhythm [Normal S1, S2] : normal s1, s2 [No Resp Distress] : no resp distress [Clear to Auscultation Bilaterally] : clear to auscultation bilaterally [Normal Gait] : normal gait [No Clubbing] : no clubbing [No Edema] : no edema [Normal Color/ Pigmentation] : normal color/ pigmentation [Oriented x3] : oriented x3 [Normal Affect] : normal affect [TextBox_2] : very thin appearing but does not look sickly or ill [TextBox_68] : there was rhonchi at the right base, then she coughed during exam and breath sounds normalized

## 2021-09-30 NOTE — DISCUSSION/SUMMARY
[FreeTextEntry1] : 80yo woman w/ bronchiectasis/KOKI here for routine follow-up. Doing well on azithro and ethambutol with objective improvement on lung function but still with fatigue and general lack of vibrance or energy she said she once had. \par \par Will continue the azithro and ethambutol as they are clearly helping and will add rifampin QOD. She was counseled on the side effects and not be alarmed with orange bodily fluids and tears. CBC and CMP drawn today for monitoring on rx. Encouragement and reassurance was provided. \par \par F/u in 2mos

## 2021-09-30 NOTE — REVIEW OF SYSTEMS
[Fatigue] : fatigue [Cough] : cough [Negative] : Endocrine [Sputum] : no sputum [TextBox_3] : lassitude

## 2021-09-30 NOTE — PROCEDURE
[FreeTextEntry1] : Rishi in office today shows significant improvement in her FEV1 and FVC compared to prior

## 2021-10-18 RX ORDER — RIFAMPIN 300 MG/1
300 CAPSULE ORAL
Qty: 90 | Refills: 3 | Status: ACTIVE | COMMUNITY
Start: 2017-12-26 | End: 1900-01-01

## 2021-10-18 RX ORDER — AZITHROMYCIN 500 MG/1
500 TABLET, FILM COATED ORAL
Qty: 45 | Refills: 3 | Status: ACTIVE | COMMUNITY
Start: 2021-08-29 | End: 1900-01-01

## 2021-12-02 ENCOUNTER — NON-APPOINTMENT (OUTPATIENT)
Age: 81
End: 2021-12-02

## 2021-12-02 ENCOUNTER — APPOINTMENT (OUTPATIENT)
Dept: OPHTHALMOLOGY | Facility: CLINIC | Age: 81
End: 2021-12-02
Payer: MEDICARE

## 2021-12-02 PROCEDURE — 92014 COMPRE OPH EXAM EST PT 1/>: CPT

## 2021-12-06 ENCOUNTER — APPOINTMENT (OUTPATIENT)
Dept: PULMONOLOGY | Facility: CLINIC | Age: 81
End: 2021-12-06
Payer: MEDICARE

## 2021-12-06 VITALS
HEART RATE: 58 BPM | OXYGEN SATURATION: 97 % | HEIGHT: 65 IN | DIASTOLIC BLOOD PRESSURE: 78 MMHG | TEMPERATURE: 97.8 F | BODY MASS INDEX: 17.33 KG/M2 | SYSTOLIC BLOOD PRESSURE: 120 MMHG | WEIGHT: 104 LBS

## 2021-12-06 PROCEDURE — 71046 X-RAY EXAM CHEST 2 VIEWS: CPT

## 2021-12-06 PROCEDURE — 36415 COLL VENOUS BLD VENIPUNCTURE: CPT

## 2021-12-06 PROCEDURE — 99214 OFFICE O/P EST MOD 30 MIN: CPT | Mod: 25

## 2021-12-08 NOTE — REVIEW OF SYSTEMS
[Cough] : cough [Dyspnea] : dyspnea [Negative] : Endocrine [TextBox_3] : feels better in general [TextBox_30] : minimal sputum

## 2021-12-08 NOTE — PROCEDURE
[FreeTextEntry1] : chest film is stable. the radiologic issues that caused treatment are not apparent on a plain film

## 2021-12-08 NOTE — DISCUSSION/SUMMARY
[FreeTextEntry1] : she is improved but stilil for unclear reasons.\par \par treatment?  inhalers,  functioal?\par \par will finish a year of treatment\par \par sputum brought in this time to see if she's culture negative yet. only thre months of treatment

## 2022-03-07 ENCOUNTER — APPOINTMENT (OUTPATIENT)
Dept: PULMONOLOGY | Facility: CLINIC | Age: 82
End: 2022-03-07
Payer: MEDICARE

## 2022-03-07 VITALS
HEIGHT: 65 IN | TEMPERATURE: 95.9 F | HEART RATE: 69 BPM | SYSTOLIC BLOOD PRESSURE: 140 MMHG | DIASTOLIC BLOOD PRESSURE: 80 MMHG | OXYGEN SATURATION: 98 %

## 2022-03-07 PROCEDURE — 36415 COLL VENOUS BLD VENIPUNCTURE: CPT

## 2022-03-07 PROCEDURE — 94010 BREATHING CAPACITY TEST: CPT

## 2022-03-07 PROCEDURE — 99214 OFFICE O/P EST MOD 30 MIN: CPT | Mod: 25

## 2022-03-08 ENCOUNTER — APPOINTMENT (OUTPATIENT)
Dept: OPHTHALMOLOGY | Facility: CLINIC | Age: 82
End: 2022-03-08
Payer: MEDICARE

## 2022-03-08 ENCOUNTER — NON-APPOINTMENT (OUTPATIENT)
Age: 82
End: 2022-03-08

## 2022-03-08 PROCEDURE — 92014 COMPRE OPH EXAM EST PT 1/>: CPT

## 2022-03-08 PROCEDURE — 92083 EXTENDED VISUAL FIELD XM: CPT

## 2022-03-08 NOTE — HISTORY OF PRESENT ILLNESS
[TextBox_4] : is now up to six months of treatment  december sptum was negative, so this will continue until december of 2022\par \par her cough is unchanged,\par  botox injections proposed  she denies\par \par doesn't want higher dose esperanza  due to fatigue\par \par this is an intractable neurogenic cough  unrelated to the maritza\par \par her dyspne is better as a result of breztri and less fatigue from maritza treatment

## 2022-03-08 NOTE — REVIEW OF SYSTEMS
[Cough] : cough [Dyspnea] : dyspnea [Negative] : Endocrine [TextBox_3] : feels better in general [TextBox_30] : minimal sputum  dyspnea is much better

## 2022-03-08 NOTE — DISCUSSION/SUMMARY
[FreeTextEntry1] : doing well\par \par will end treatment in dec of 2022\par \par blood drawn\par \par alternatie day treatment continues\par \par breathing is improved likely due to inhaler not maritza treatment

## 2022-06-06 ENCOUNTER — APPOINTMENT (OUTPATIENT)
Dept: PULMONOLOGY | Facility: CLINIC | Age: 82
End: 2022-06-06
Payer: MEDICARE

## 2022-06-06 VITALS
BODY MASS INDEX: 17.33 KG/M2 | WEIGHT: 104 LBS | HEIGHT: 65 IN | HEART RATE: 55 BPM | SYSTOLIC BLOOD PRESSURE: 121 MMHG | OXYGEN SATURATION: 97 % | TEMPERATURE: 97.1 F | DIASTOLIC BLOOD PRESSURE: 77 MMHG

## 2022-06-06 PROCEDURE — 99215 OFFICE O/P EST HI 40 MIN: CPT | Mod: 25

## 2022-06-06 PROCEDURE — 71046 X-RAY EXAM CHEST 2 VIEWS: CPT

## 2022-06-06 RX ORDER — TIOTROPIUM BROMIDE AND OLODATEROL 3.124; 2.736 UG/1; UG/1
2.5-2.5 SPRAY, METERED RESPIRATORY (INHALATION)
Qty: 1 | Refills: 11 | Status: DISCONTINUED | COMMUNITY
Start: 2022-01-03 | End: 2022-06-06

## 2022-06-06 RX ORDER — UMECLIDINIUM BROMIDE AND VILANTEROL TRIFENATATE 62.5; 25 UG/1; UG/1
62.5-25 POWDER RESPIRATORY (INHALATION)
Qty: 3 | Refills: 3 | Status: DISCONTINUED | COMMUNITY
Start: 2021-11-08 | End: 2022-06-06

## 2022-06-06 RX ORDER — MUCUS CLEARING DEVICE
EACH MISCELLANEOUS
Qty: 1 | Refills: 3 | Status: ACTIVE | COMMUNITY
Start: 2022-06-06 | End: 1900-01-01

## 2022-06-07 NOTE — DISCUSSION/SUMMARY
[FreeTextEntry1] : treatment to continue up to decomenber of this year\par \par discussed with her the need to undergo pulmonary hygeine training\par \par reviewed aerobikia film with her\par \par returnin three months\par

## 2022-06-07 NOTE — HISTORY OF PRESENT ILLNESS
[TextBox_4] : doing well on alternate day treatment\par \par did not help the cough\par \par breathing better-  \par \par she never made true criteria for treatment of maritza, and biggest problem was dyspnea\par \par which may be improved by the breztri alone\par \par some concern of inhaled steroid with maritza, but breathing is much better.\par \par cough continues , this is neurogenic that was refractory to all treatment\par \par

## 2022-06-07 NOTE — PROCEDURE
[FreeTextEntry1] : spirometry remains improved to \par \par she is doing well with treatment\par \par

## 2022-08-10 LAB
ACID FAST STN SPT: ABNORMAL
ACID FAST STN SPT: NORMAL
ALBUMIN SERPL ELPH-MCNC: 4.4 G/DL
ALBUMIN SERPL ELPH-MCNC: 4.4 G/DL
ALBUMIN SERPL ELPH-MCNC: 4.7 G/DL
ALP BLD-CCNC: 44 U/L
ALP BLD-CCNC: 54 U/L
ALP BLD-CCNC: 58 U/L
ALT SERPL-CCNC: 10 U/L
ALT SERPL-CCNC: 10 U/L
ALT SERPL-CCNC: 12 U/L
ANION GAP SERPL CALC-SCNC: 12 MMOL/L
ANION GAP SERPL CALC-SCNC: 13 MMOL/L
ANION GAP SERPL CALC-SCNC: 14 MMOL/L
AST SERPL-CCNC: 18 U/L
AST SERPL-CCNC: 19 U/L
AST SERPL-CCNC: 21 U/L
BASOPHILS # BLD AUTO: 0.03 K/UL
BASOPHILS # BLD AUTO: 0.05 K/UL
BASOPHILS # BLD AUTO: 0.06 K/UL
BASOPHILS NFR BLD AUTO: 0.8 %
BASOPHILS NFR BLD AUTO: 1 %
BASOPHILS NFR BLD AUTO: 1.1 %
BILIRUB SERPL-MCNC: 0.2 MG/DL
BILIRUB SERPL-MCNC: 0.3 MG/DL
BILIRUB SERPL-MCNC: 0.5 MG/DL
BUN SERPL-MCNC: 11 MG/DL
BUN SERPL-MCNC: 8 MG/DL
BUN SERPL-MCNC: 9 MG/DL
CALCIUM SERPL-MCNC: 8.6 MG/DL
CALCIUM SERPL-MCNC: 8.8 MG/DL
CALCIUM SERPL-MCNC: 9 MG/DL
CHLORIDE SERPL-SCNC: 100 MMOL/L
CHLORIDE SERPL-SCNC: 97 MMOL/L
CHLORIDE SERPL-SCNC: 98 MMOL/L
CO2 SERPL-SCNC: 20 MMOL/L
CO2 SERPL-SCNC: 21 MMOL/L
CO2 SERPL-SCNC: 22 MMOL/L
CREAT SERPL-MCNC: 0.66 MG/DL
CREAT SERPL-MCNC: 0.72 MG/DL
CREAT SERPL-MCNC: 0.75 MG/DL
EGFR: 89 ML/MIN/1.73M2
EOSINOPHIL # BLD AUTO: 0.06 K/UL
EOSINOPHIL # BLD AUTO: 0.1 K/UL
EOSINOPHIL # BLD AUTO: 0.1 K/UL
EOSINOPHIL NFR BLD AUTO: 1.6 %
EOSINOPHIL NFR BLD AUTO: 1.9 %
EOSINOPHIL NFR BLD AUTO: 2 %
GLUCOSE SERPL-MCNC: 79 MG/DL
GLUCOSE SERPL-MCNC: 81 MG/DL
GLUCOSE SERPL-MCNC: 87 MG/DL
HCT VFR BLD CALC: 38.7 %
HCT VFR BLD CALC: 39.5 %
HCT VFR BLD CALC: 40 %
HGB BLD-MCNC: 12.6 G/DL
HGB BLD-MCNC: 12.7 G/DL
HGB BLD-MCNC: 12.8 G/DL
IMM GRANULOCYTES NFR BLD AUTO: 0.2 %
IMM GRANULOCYTES NFR BLD AUTO: 0.2 %
IMM GRANULOCYTES NFR BLD AUTO: 0.3 %
LYMPHOCYTES # BLD AUTO: 1.05 K/UL
LYMPHOCYTES # BLD AUTO: 1.25 K/UL
LYMPHOCYTES # BLD AUTO: 1.82 K/UL
LYMPHOCYTES NFR BLD AUTO: 24.9 %
LYMPHOCYTES NFR BLD AUTO: 28 %
LYMPHOCYTES NFR BLD AUTO: 34.7 %
MAN DIFF?: NORMAL
MCHC RBC-ENTMCNC: 30.6 PG
MCHC RBC-ENTMCNC: 30.7 PG
MCHC RBC-ENTMCNC: 30.7 PG
MCHC RBC-ENTMCNC: 32 GM/DL
MCHC RBC-ENTMCNC: 32.2 GM/DL
MCHC RBC-ENTMCNC: 32.6 GM/DL
MCV RBC AUTO: 94.2 FL
MCV RBC AUTO: 95.4 FL
MCV RBC AUTO: 95.7 FL
MONOCYTES # BLD AUTO: 0.54 K/UL
MONOCYTES # BLD AUTO: 0.54 K/UL
MONOCYTES # BLD AUTO: 0.65 K/UL
MONOCYTES NFR BLD AUTO: 10.3 %
MONOCYTES NFR BLD AUTO: 12.9 %
MONOCYTES NFR BLD AUTO: 14.4 %
NEUTROPHILS # BLD AUTO: 2.06 K/UL
NEUTROPHILS # BLD AUTO: 2.71 K/UL
NEUTROPHILS # BLD AUTO: 2.97 K/UL
NEUTROPHILS NFR BLD AUTO: 51.8 %
NEUTROPHILS NFR BLD AUTO: 54.9 %
NEUTROPHILS NFR BLD AUTO: 59 %
PLATELET # BLD AUTO: 244 K/UL
PLATELET # BLD AUTO: 277 K/UL
PLATELET # BLD AUTO: 301 K/UL
POTASSIUM SERPL-SCNC: 4.3 MMOL/L
POTASSIUM SERPL-SCNC: 4.8 MMOL/L
POTASSIUM SERPL-SCNC: 4.8 MMOL/L
PROT SERPL-MCNC: 6.5 G/DL
PROT SERPL-MCNC: 6.9 G/DL
PROT SERPL-MCNC: 7 G/DL
RBC # BLD: 4.11 M/UL
RBC # BLD: 4.14 M/UL
RBC # BLD: 4.18 M/UL
RBC # FLD: 13.1 %
RBC # FLD: 13.2 %
RBC # FLD: 13.8 %
SARS-COV-2 N GENE NPH QL NAA+PROBE: NOT DETECTED
SODIUM SERPL-SCNC: 131 MMOL/L
SODIUM SERPL-SCNC: 132 MMOL/L
SODIUM SERPL-SCNC: 134 MMOL/L
WBC # FLD AUTO: 3.75 K/UL
WBC # FLD AUTO: 5.03 K/UL
WBC # FLD AUTO: 5.24 K/UL

## 2022-09-12 ENCOUNTER — APPOINTMENT (OUTPATIENT)
Dept: PULMONOLOGY | Facility: CLINIC | Age: 82
End: 2022-09-12

## 2022-09-12 VITALS
HEART RATE: 58 BPM | WEIGHT: 104 LBS | SYSTOLIC BLOOD PRESSURE: 116 MMHG | DIASTOLIC BLOOD PRESSURE: 71 MMHG | TEMPERATURE: 97.1 F | BODY MASS INDEX: 17.33 KG/M2 | HEIGHT: 65 IN | OXYGEN SATURATION: 97 %

## 2022-09-12 PROCEDURE — 94010 BREATHING CAPACITY TEST: CPT

## 2022-09-12 PROCEDURE — 99214 OFFICE O/P EST MOD 30 MIN: CPT | Mod: 25

## 2022-09-12 RX ORDER — FLUTICASONE FUROATE, UMECLIDINIUM BROMIDE AND VILANTEROL TRIFENATATE 200; 62.5; 25 UG/1; UG/1; UG/1
200-62.5-25 POWDER RESPIRATORY (INHALATION)
Qty: 1 | Refills: 11 | Status: DISCONTINUED | COMMUNITY
Start: 2021-07-19 | End: 2022-09-12

## 2022-09-12 RX ORDER — GABAPENTIN 100 MG/1
100 CAPSULE ORAL
Qty: 90 | Refills: 3 | Status: DISCONTINUED | COMMUNITY
Start: 2017-02-03 | End: 2022-09-12

## 2022-09-12 RX ORDER — GABAPENTIN 300 MG/1
300 CAPSULE ORAL 3 TIMES DAILY
Qty: 90 | Refills: 11 | Status: DISCONTINUED | COMMUNITY
Start: 2017-02-20 | End: 2022-09-12

## 2022-09-12 RX ORDER — GABAPENTIN 100 MG/1
100 CAPSULE ORAL
Qty: 90 | Refills: 0 | Status: DISCONTINUED | COMMUNITY
Start: 2017-02-23 | End: 2022-09-12

## 2022-09-13 ENCOUNTER — APPOINTMENT (OUTPATIENT)
Dept: OPHTHALMOLOGY | Facility: CLINIC | Age: 82
End: 2022-09-13

## 2022-09-13 ENCOUNTER — NON-APPOINTMENT (OUTPATIENT)
Age: 82
End: 2022-09-13

## 2022-09-13 PROCEDURE — 92083 EXTENDED VISUAL FIELD XM: CPT

## 2022-09-13 PROCEDURE — 92014 COMPRE OPH EXAM EST PT 1/>: CPT

## 2022-09-13 PROCEDURE — 92134 CPTRZ OPH DX IMG PST SGM RTA: CPT

## 2022-09-13 NOTE — REASON FOR VISIT
[Follow-Up] : a follow-up visit [TextBox_44] : doing well in terms of breathing on trelegy, chronic cough unaffected by maritza treatment or inhaler

## 2022-09-13 NOTE — DISCUSSION/SUMMARY
[FreeTextEntry1] : she will end the maritza treatment in decermber\par \par trelegey has helpe her breathing\par \par cough is, and will remain intractable despite all efforts to improve it

## 2022-09-13 NOTE — PROCEDURE
[FreeTextEntry1] : spiroemtry remains modestly improved\par \par maritza treatment to end in december\par \par will get a sputum culture for me

## 2022-09-13 NOTE — HISTORY OF PRESENT ILLNESS
[TextBox_4] : treatment for KOKI to end in December\par \par has improved due to use of trelegy, doubt  koki treatment was necessary or meaningful\par \par spirometyr is better and    exercise tolerance better on inhaler\par \par cough, which is neurogenic is intractable and  did nt want to do what trell suggested/ botox, or higher dose of  gabapentin\par

## 2022-09-30 ENCOUNTER — RX RENEWAL (OUTPATIENT)
Age: 82
End: 2022-09-30

## 2022-10-07 ENCOUNTER — RX RENEWAL (OUTPATIENT)
Age: 82
End: 2022-10-07

## 2022-10-07 RX ORDER — ETHAMBUTOL HYDROCHLORIDE 400 MG/1
400 TABLET ORAL
Qty: 90 | Refills: 3 | Status: ACTIVE | COMMUNITY
Start: 2017-12-26 | End: 1900-01-01

## 2022-12-01 NOTE — ED PROVIDER NOTE - IV ALTEPLASE EXCL REL HIDDEN
Observe for now without intervention. The patient was advised to contact office with any change or worsening of vision. show

## 2022-12-12 ENCOUNTER — APPOINTMENT (OUTPATIENT)
Dept: PULMONOLOGY | Facility: CLINIC | Age: 82
End: 2022-12-12

## 2022-12-12 ENCOUNTER — LABORATORY RESULT (OUTPATIENT)
Age: 82
End: 2022-12-12

## 2022-12-12 VITALS
TEMPERATURE: 97.2 F | SYSTOLIC BLOOD PRESSURE: 140 MMHG | HEIGHT: 65 IN | OXYGEN SATURATION: 96 % | DIASTOLIC BLOOD PRESSURE: 60 MMHG | BODY MASS INDEX: 17.33 KG/M2 | HEART RATE: 65 BPM | WEIGHT: 104 LBS

## 2022-12-12 PROCEDURE — 94010 BREATHING CAPACITY TEST: CPT

## 2022-12-12 PROCEDURE — 99214 OFFICE O/P EST MOD 30 MIN: CPT | Mod: 25

## 2022-12-13 NOTE — REASON FOR VISIT
[Follow-Up] : a follow-up visit [TextBox_44] : patient with chronic cough and being treated for maritza

## 2022-12-13 NOTE — HISTORY OF PRESENT ILLNESS
[TextBox_4] : patient had unexplained malaise and lethargy so treated for maritza although did not meet criteria\par \par also treated with bronchodilators and inhaled steroid and has breathing has improved\par \par as did her lethargy\par \par cause and effect of treatment\par \par last sputum was positive, negative before that

## 2022-12-13 NOTE — PROCEDURE
[FreeTextEntry1] : spirometry is improved from before\par \par gave me sputum sample to see if we can stop the treatment\par \par continue with inhaled meds\par \par fu in six months

## 2023-01-19 NOTE — ED ADULT NURSE NOTE - NEURO BEHAVIOR
Isotretinoin Counseling: Patient should get monthly blood tests, not donate blood, not drive at night if vision affected, not share medication, and not undergo elective surgery for 6 months after tx completed. Side effects reviewed, pt to contact office should one occur. calm

## 2023-01-24 ENCOUNTER — APPOINTMENT (OUTPATIENT)
Dept: PULMONOLOGY | Facility: CLINIC | Age: 83
End: 2023-01-24

## 2023-02-06 RX ORDER — UMECLIDINIUM BROMIDE AND VILANTEROL TRIFENATATE 62.5; 25 UG/1; UG/1
62.5-25 POWDER RESPIRATORY (INHALATION)
Qty: 1 | Refills: 11 | Status: ACTIVE | COMMUNITY
Start: 2023-02-04 | End: 1900-01-01

## 2023-02-08 RX ORDER — TIOTROPIUM BROMIDE AND OLODATEROL 3.124; 2.736 UG/1; UG/1
2.5-2.5 SPRAY, METERED RESPIRATORY (INHALATION)
Qty: 1 | Refills: 11 | Status: ACTIVE | COMMUNITY
Start: 2023-02-08 | End: 1900-01-01

## 2023-03-07 ENCOUNTER — NON-APPOINTMENT (OUTPATIENT)
Age: 83
End: 2023-03-07

## 2023-03-07 ENCOUNTER — APPOINTMENT (OUTPATIENT)
Dept: OPHTHALMOLOGY | Facility: CLINIC | Age: 83
End: 2023-03-07
Payer: MEDICARE

## 2023-03-07 PROCEDURE — 92014 COMPRE OPH EXAM EST PT 1/>: CPT

## 2023-03-07 PROCEDURE — 92134 CPTRZ OPH DX IMG PST SGM RTA: CPT

## 2023-06-05 ENCOUNTER — APPOINTMENT (OUTPATIENT)
Dept: PULMONOLOGY | Facility: CLINIC | Age: 83
End: 2023-06-05
Payer: MEDICARE

## 2023-06-05 VITALS
BODY MASS INDEX: 17.33 KG/M2 | HEART RATE: 56 BPM | OXYGEN SATURATION: 98 % | DIASTOLIC BLOOD PRESSURE: 76 MMHG | WEIGHT: 104 LBS | HEIGHT: 65 IN | TEMPERATURE: 97.9 F | SYSTOLIC BLOOD PRESSURE: 138 MMHG

## 2023-06-05 PROCEDURE — 94010 BREATHING CAPACITY TEST: CPT

## 2023-06-06 NOTE — REVIEW OF SYSTEMS
[Cough] : cough [Dyspnea] : dyspnea [Negative] : Endocrine [TextBox_30] : minimal sputum  dyspnea is much better  cough is horrible [TextBox_3] : feels better in general

## 2023-06-06 NOTE — HISTORY OF PRESENT ILLNESS
[TextBox_4] : cough is worse than ever \par \par not clear if th cough is worse since stopping treating.maritza-  she never met criteria for treatment but she insisted that she was fatigued and that the treatment helped her.  i'm not clear that this is cause and effectd\par \par her breating is better on the  breztri-  there is little to suggest asthma, however, but she says this helped more than the laba. lama\par \par will try backlofen for what seems like neurogenic cough\par \par her cough has defied all treatments.\par \par

## 2023-06-06 NOTE — PROCEDURE
[FreeTextEntry1] : spirometry remains better than it was prior institution of  inhlaer\par \par no longer on treatment,  i never agreed with its treatment anyway so i stopped it after a year.  i gave it to her for "consttitutional reasons".\par \par fu in six months

## 2023-06-12 RX ORDER — BACLOFEN 5 MG/1
5 TABLET ORAL
Qty: 90 | Refills: 3 | Status: ACTIVE | COMMUNITY
Start: 2023-06-05 | End: 1900-01-01

## 2023-11-03 ENCOUNTER — APPOINTMENT (OUTPATIENT)
Dept: PULMONOLOGY | Facility: CLINIC | Age: 83
End: 2023-11-03
Payer: MEDICARE

## 2023-11-03 PROCEDURE — 99213 OFFICE O/P EST LOW 20 MIN: CPT | Mod: 25

## 2023-11-03 PROCEDURE — 94010 BREATHING CAPACITY TEST: CPT

## 2023-11-07 RX ORDER — BUDESONIDE, GLYCOPYRROLATE, AND FORMOTEROL FUMARATE 160; 9; 4.8 UG/1; UG/1; UG/1
160-9-4.8 AEROSOL, METERED RESPIRATORY (INHALATION)
Qty: 10.7 | Refills: 11 | Status: ACTIVE | COMMUNITY
Start: 2021-08-17 | End: 1900-01-01

## 2023-11-10 ENCOUNTER — APPOINTMENT (OUTPATIENT)
Dept: OTOLARYNGOLOGY | Facility: CLINIC | Age: 83
End: 2023-11-10
Payer: MEDICARE

## 2023-11-10 VITALS
TEMPERATURE: 97.9 F | WEIGHT: 105 LBS | SYSTOLIC BLOOD PRESSURE: 162 MMHG | HEIGHT: 65 IN | HEART RATE: 57 BPM | BODY MASS INDEX: 17.49 KG/M2 | DIASTOLIC BLOOD PRESSURE: 72 MMHG

## 2023-11-10 DIAGNOSIS — H61.21 IMPACTED CERUMEN, RIGHT EAR: ICD-10-CM

## 2023-11-10 DIAGNOSIS — R05.9 COUGH, UNSPECIFIED: ICD-10-CM

## 2023-11-10 DIAGNOSIS — K21.9 GASTRO-ESOPHAGEAL REFLUX DISEASE W/OUT ESOPHAGITIS: ICD-10-CM

## 2023-11-10 PROCEDURE — 31579 LARYNGOSCOPY TELESCOPIC: CPT

## 2023-11-10 PROCEDURE — 99205 OFFICE O/P NEW HI 60 MIN: CPT | Mod: 25

## 2023-11-10 PROCEDURE — 69210 REMOVE IMPACTED EAR WAX UNI: CPT | Mod: RT

## 2023-11-10 RX ORDER — BENZONATATE 100 MG/1
100 CAPSULE ORAL
Qty: 30 | Refills: 0 | Status: ACTIVE | COMMUNITY
Start: 2023-11-10 | End: 1900-01-01

## 2023-11-10 RX ORDER — OMEPRAZOLE 40 MG/1
40 CAPSULE, DELAYED RELEASE ORAL
Qty: 90 | Refills: 0 | Status: ACTIVE | COMMUNITY
Start: 2023-11-10 | End: 1900-01-01

## 2023-11-10 RX ORDER — FAMOTIDINE 20 MG/1
20 TABLET, FILM COATED ORAL DAILY
Qty: 90 | Refills: 0 | Status: ACTIVE | COMMUNITY
Start: 2023-11-10 | End: 1900-01-01

## 2024-01-18 LAB
ACID FAST STN SPT: ABNORMAL
ACID FAST STN SPT: NORMAL
ALBUMIN SERPL ELPH-MCNC: 4.5 G/DL
ALP BLD-CCNC: 47 U/L
ALT SERPL-CCNC: 14 U/L
ANION GAP SERPL CALC-SCNC: 11 MMOL/L
AST SERPL-CCNC: 21 U/L
BASOPHILS # BLD AUTO: 0.04 K/UL
BASOPHILS NFR BLD AUTO: 0.8 %
BILIRUB SERPL-MCNC: 0.3 MG/DL
BUN SERPL-MCNC: 12 MG/DL
CALCIUM SERPL-MCNC: 9 MG/DL
CHLORIDE SERPL-SCNC: 94 MMOL/L
CO2 SERPL-SCNC: 25 MMOL/L
CREAT SERPL-MCNC: 0.65 MG/DL
EGFR: 89 ML/MIN/1.73M2
EOSINOPHIL # BLD AUTO: 0.06 K/UL
EOSINOPHIL NFR BLD AUTO: 1.3 %
GLUCOSE SERPL-MCNC: 93 MG/DL
HCT VFR BLD CALC: 38.9 %
HGB BLD-MCNC: 12.7 G/DL
IMM GRANULOCYTES NFR BLD AUTO: 0.2 %
LYMPHOCYTES # BLD AUTO: 1.32 K/UL
LYMPHOCYTES NFR BLD AUTO: 28 %
MAN DIFF?: NORMAL
MCHC RBC-ENTMCNC: 30.9 PG
MCHC RBC-ENTMCNC: 32.6 GM/DL
MCV RBC AUTO: 94.6 FL
MONOCYTES # BLD AUTO: 0.59 K/UL
MONOCYTES NFR BLD AUTO: 12.5 %
NEUTROPHILS # BLD AUTO: 2.69 K/UL
NEUTROPHILS NFR BLD AUTO: 57.2 %
PLATELET # BLD AUTO: 281 K/UL
POTASSIUM SERPL-SCNC: 4.9 MMOL/L
PROT SERPL-MCNC: 6.4 G/DL
RBC # BLD: 4.11 M/UL
RBC # FLD: 13.3 %
SODIUM SERPL-SCNC: 130 MMOL/L
WBC # FLD AUTO: 4.71 K/UL

## 2024-03-11 ENCOUNTER — NON-APPOINTMENT (OUTPATIENT)
Age: 84
End: 2024-03-11

## 2024-03-11 ENCOUNTER — APPOINTMENT (OUTPATIENT)
Dept: OPHTHALMOLOGY | Facility: CLINIC | Age: 84
End: 2024-03-11
Payer: MEDICARE

## 2024-03-11 PROCEDURE — 92134 CPTRZ OPH DX IMG PST SGM RTA: CPT

## 2024-03-11 PROCEDURE — 92014 COMPRE OPH EXAM EST PT 1/>: CPT

## 2024-07-23 ENCOUNTER — APPOINTMENT (OUTPATIENT)
Dept: ENDOCRINOLOGY | Facility: CLINIC | Age: 84
End: 2024-07-23
Payer: MEDICARE

## 2024-07-23 VITALS
BODY MASS INDEX: 17.47 KG/M2 | HEART RATE: 63 BPM | SYSTOLIC BLOOD PRESSURE: 158 MMHG | DIASTOLIC BLOOD PRESSURE: 78 MMHG | WEIGHT: 105 LBS

## 2024-07-23 DIAGNOSIS — M81.0 AGE-RELATED OSTEOPOROSIS W/OUT CURRENT PATHOLOGICAL FRACTURE: ICD-10-CM

## 2024-07-23 DIAGNOSIS — Z87.891 PERSONAL HISTORY OF NICOTINE DEPENDENCE: ICD-10-CM

## 2024-07-23 DIAGNOSIS — R26.89 OTHER ABNORMALITIES OF GAIT AND MOBILITY: ICD-10-CM

## 2024-07-23 PROCEDURE — 99204 OFFICE O/P NEW MOD 45 MIN: CPT

## 2024-07-23 RX ORDER — CHROMIUM 200 MCG
TABLET ORAL
Refills: 0 | Status: ACTIVE | COMMUNITY

## 2024-07-23 RX ORDER — PNV NO.95/FERROUS FUM/FOLIC AC 28MG-0.8MG
TABLET ORAL
Refills: 0 | Status: ACTIVE | COMMUNITY

## 2024-07-23 RX ORDER — LINACLOTIDE 145 UG/1
145 CAPSULE, GELATIN COATED ORAL
Refills: 0 | Status: ACTIVE | COMMUNITY

## 2024-07-23 NOTE — HISTORY OF PRESENT ILLNESS
[FreeTextEntry1] : Ms. Salcedo is an 83 year-old woman presenting to establish care with me for osteoporosis. She is a former patient of Dr. Noreen Starr.   Bone History Osteoporosis diagnosed in 2015 on routine bone density testing significant for T-scores of -2.7 at the femoral neck and -2.0 at the total hip; most recent bone density as below Fracture history: None Family history: No parental history of hip fracture Treatment:  Alendronate 70 mg weekly preceding denosumab therapy; unclear course Denosumab 60 mg SC in May 2021; her  passed away around that time and unclear if she received any subsequent therapy  Falls: No Height loss: No Kidney stones: No Dental health: Regular appointments, no history of implants, no upcoming procedures planned Exercise: Walks, balance, cardiovascular, and boxing classes Dairy intake: 1 serving daily (yogurt daily) Calcium supplements: None Multivitamin: None Vitamin D supplements: 1000 intl units daily  Osteoporosis risk factors include: Postmenopausal status,  race, prior fracture, falls, height loss, small thin bones, tobacco use, excessive alcohol, anorexia, family history, vitamin D deficiency, corticosteroid use, seizure medications, malabsorption, hyperparathyroidism, hyperthyroidism. NEGATIVE EXCEPT: Postmenopausal status,  race

## 2024-07-23 NOTE — DATA REVIEWED
[FreeTextEntry1] : Laboratories (June 26, 2024) reviewed and significant for:  Unremarkable complete blood count Calcium 9.2 mg/dL (albumin 4.3 g/dl) BUN/creatinine 10/0.70 mg/dL (eGFR 81 mL/min) Alkaline phosphatase 65 U/L TSH 1.03 uIU/mL 25-hydroxyvitamin D 34.3 ng/mL  Most recent bone mineral density Date: April 28, 2023 Source: [facility] Site: [facility]  Site	BMD	T-score	Change previous	Change baseline	 Lumbar spine	0.972	-0.4		+0.4%	 Femoral neck	0.552	-2.7		+0.4%	 Total hip           0.689	-2.1		-1.0%	 Distal radius					 DXA comments: *Significant change from previous; L4 excluded; left hip values  Impression: I have personally reviewed the DXA images and report, and I compared these images to a DXA dated April 27, 2021 from [facility]. There is osteoporosis by the World Health Organization criteria. There were no significant changes from previous.

## 2024-07-23 NOTE — PHYSICAL EXAM
[Alert] : alert [Healthy Appearance] : healthy appearance [No Acute Distress] : no acute distress [Normal Sclera/Conjunctiva] : normal sclera/conjunctiva [Normal Hearing] : hearing was normal [No Neck Mass] : no neck mass was observed [No LAD] : no lymphadenopathy [Supple] : the neck was supple [Thyroid Not Enlarged] : the thyroid was not enlarged [No Respiratory Distress] : no respiratory distress [Clear to Auscultation] : lungs were clear to auscultation bilaterally [Normal S1, S2] : normal S1 and S2 [Normal Rate] : heart rate was normal [Regular Rhythm] : with a regular rhythm [No Spinal Tenderness] : no spinal tenderness [No Stigmata of Cushings Syndrome] : no stigmata of Cushings Syndrome [Normal Insight/Judgement] : insight and judgment were intact [Kyphosis] : no kyphosis present [Scoliosis] : no scoliosis [Acanthosis Nigricans] : no acanthosis nigricans [de-identified] : narrow-based gait with cane [de-identified] : + difficulty balancing on one leg bilaterally

## 2024-07-23 NOTE — ASSESSMENT
[FreeTextEntry1] : Osteoporosis. She has no history of fragility fracture. She has an unclear history of prior osteoporosis therapy; she will look through her personal records to attempt to clarify her course. 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 We discussed the potential benefits and risks of antiresorptive osteoporosis therapy, including but not limited to osteonecrosis of the jaw and atypical femoral fracture. We will further address pharmacologic osteoporosis therapy pending interval bone density testing. Interval bone density testing Calcium 7840-6419 mg daily from diet and supplements (to be taken in divided doses as no more than 500-600 mg can be absorbed at one time); advised increased dietary and/or supplemental calcium Continue current vitamin D regimen Diet, exercise and fall prevention discussed Physical therapy for balance and bone health  I reviewed the DXA performed on April 28, 2023 with the patient today. I reviewed the laboratories performed on June 26, 2024 with the patient today.  I counseled the patient regarding calcium and vitamin D intake today. I discussed the following osteoporosis therapies: Denosumab  CC: Dr. Edgar Blue, Fax [phone]

## 2024-08-01 ENCOUNTER — APPOINTMENT (OUTPATIENT)
Dept: HEART AND VASCULAR | Facility: CLINIC | Age: 84
End: 2024-08-01
Payer: MEDICARE

## 2024-08-01 VITALS
HEIGHT: 65 IN | OXYGEN SATURATION: 96 % | BODY MASS INDEX: 17.49 KG/M2 | TEMPERATURE: 96.2 F | DIASTOLIC BLOOD PRESSURE: 89 MMHG | HEART RATE: 70 BPM | WEIGHT: 105 LBS | SYSTOLIC BLOOD PRESSURE: 179 MMHG

## 2024-08-01 PROCEDURE — 93000 ELECTROCARDIOGRAM COMPLETE: CPT

## 2024-08-01 PROCEDURE — 99204 OFFICE O/P NEW MOD 45 MIN: CPT

## 2024-08-04 ENCOUNTER — NON-APPOINTMENT (OUTPATIENT)
Age: 84
End: 2024-08-04

## 2024-08-06 NOTE — ASSESSMENT
[FreeTextEntry1] : Ms. Salcedo is an active 83 year old lady who presents with lightheadedness/fullness of head with ambulation and abnormal finding on Ziopatch.   Ziopatch is notable for 3.1 second sinus pause at 4am, SVT lasting up to 13.7 seconds with maximum interval 214 bpm. All totally asymptomatic. We discussed implantation of pacemaker will be unlikely to improve her symptoms. We discussed ILR for long term surveillance of lightheadedness to correlate with symptoms. We further recommended Neurology/ENT consult for her symptoms.  We discussed different continuous monitoring options including frequent ECGs, annual event monitors and implantable loop recorder to monitor for arrhythmias. Patient understands ILR is diagnostic tool to provide information for arrhythmia management if needed. She will think about her options. Patient will return to clinic in 6-8 weeks.

## 2024-08-06 NOTE — HISTORY OF PRESENT ILLNESS
[FreeTextEntry1] : 83 year old female presenting to the office in for initial evaluation of abnormal findings on Ziopatch.   Patient complains of lightheadedness/"fuzziness in her head" for the past few weeks. She states she is very active, walks [address] [place] house [place] [address]. She attends boxing classes weekly. However, her activities have been limited by lightheadedness and easy fatigue. She notes her pulse is intermittently 30-40 bpm for 5 miuntes during waking hours.    Patient denies chest pain, shortness of breath, orthopnea, syncope, pre-syncopal symptoms.   Ziopatch 6/27/2024-7/11/2024 SR with mean rates of 63 bpm, longest pause 3.1 seconds at 4am, SVT with fastest interval of 215 bpm, longest lasting 13.7 seconds. all asymptomatic.

## 2024-08-06 NOTE — REASON FOR VISIT
[Arrhythmia/ECG Abnorrmalities] : arrhythmia/ECG abnormalities [Other: _____] : [unfilled] [FreeTextEntry3] : Dr. Blue

## 2024-08-06 NOTE — ADDENDUM
[FreeTextEntry1] : I, Benjy Dunne, hereby attest that the medical record entry for this patient accurately reflects signatures/notations that I made on the Date of Service in my capacity as an Attending Physician when I treated/diagnosed the above patient. I do hereby attest that this information is true, accurate and complete to the best of my knowledge and I understand that any falsification, omission, or concealment of material fact may subject me to administrative, civil, or, criminal liability. I agree with the note as written by my PA in its entirety. I was present for the entire visit and supervised the entire visit and agree with the plan as outlined.

## 2024-09-18 ENCOUNTER — APPOINTMENT (OUTPATIENT)
Dept: HEART AND VASCULAR | Facility: CLINIC | Age: 84
End: 2024-09-18

## 2025-01-03 ENCOUNTER — NON-APPOINTMENT (OUTPATIENT)
Age: 85
End: 2025-01-03

## 2025-01-03 ENCOUNTER — APPOINTMENT (OUTPATIENT)
Dept: PULMONOLOGY | Facility: CLINIC | Age: 85
End: 2025-01-03
Payer: MEDICARE

## 2025-01-03 VITALS
SYSTOLIC BLOOD PRESSURE: 130 MMHG | BODY MASS INDEX: 17.49 KG/M2 | WEIGHT: 105 LBS | OXYGEN SATURATION: 97 % | HEART RATE: 62 BPM | DIASTOLIC BLOOD PRESSURE: 80 MMHG | HEIGHT: 65 IN | TEMPERATURE: 98.7 F

## 2025-01-03 PROCEDURE — 71046 X-RAY EXAM CHEST 2 VIEWS: CPT

## 2025-01-03 PROCEDURE — 99214 OFFICE O/P EST MOD 30 MIN: CPT | Mod: 25

## 2025-01-03 RX ORDER — BENZONATATE 200 MG/1
200 CAPSULE ORAL
Qty: 90 | Refills: 3 | Status: ACTIVE | COMMUNITY
Start: 2025-01-03 | End: 1900-01-01

## 2025-06-09 ENCOUNTER — NON-APPOINTMENT (OUTPATIENT)
Age: 85
End: 2025-06-09

## 2025-06-09 ENCOUNTER — APPOINTMENT (OUTPATIENT)
Dept: OPHTHALMOLOGY | Facility: CLINIC | Age: 85
End: 2025-06-09
Payer: MEDICARE

## 2025-06-09 PROCEDURE — 92134 CPTRZ OPH DX IMG PST SGM RTA: CPT

## 2025-06-09 PROCEDURE — 92014 COMPRE OPH EXAM EST PT 1/>: CPT
